# Patient Record
(demographics unavailable — no encounter records)

---

## 2025-02-20 NOTE — ASSESSMENT
[FreeTextEntry1] : recurrent stones for litholink reveiwed stone diet consider ESWL lower pole stone given left hydro will obtain repeat CT in 1 month and reeval UA UCX today

## 2025-02-20 NOTE — HISTORY OF PRESENT ILLNESS
[FreeTextEntry1] : brings in stone 3 passed stones over past few years ct from recent ER visit reviewed additional lower pole left stone identified with mild hydro repeat renal sono today with persistent mild hydro no pain feels well creat 0.86 in ER

## 2025-03-13 NOTE — END OF VISIT
[FreeTextEntry3] : I, Dr. Paulino, personally performed the evaluation and management (E/M) services for this established patient who presents today with (a) new problem(s)/exacerbation of (an) existing condition(s). That E/M includes conducting the clinically appropriate interval history &/or exam, assessing all new/exacerbated conditions, and establishing a new plan of care. Today, my ESTHELA, Tato Whitmore, was here to observe my evaluation and management service for this new problem/exacerbated condition and follow the plan of care established by me going forward

## 2025-03-13 NOTE — PHYSICAL EXAM
[Normal Appearance] : normal appearance [General Appearance - In No Acute Distress] : no acute distress [Edema] : no peripheral edema [Abdomen Soft] : soft [Abdomen Tenderness] : non-tender [Costovertebral Angle Tenderness] : no ~M costovertebral angle tenderness [Normal Station and Gait] : the gait and station were normal for the patient's age [Oriented To Time, Place, And Person] : oriented to person, place, and time [Affect] : the affect was normal

## 2025-03-13 NOTE — HISTORY OF PRESENT ILLNESS
[FreeTextEntry1] : 49F here for follow up to review CT scan   - ureteral stone passed, resolution of hydronephrosis  - per analysis 2/2025 "not a stone" - 8 mm L lower pole stone - will perform 24 hour urine, has kit at home  - reports in the past was told needed to increaser fluid intake and has made major changes to diet  Denies: hematuria dysuria n/v/d flank pain fever chills

## 2025-03-13 NOTE — ASSESSMENT
[FreeTextEntry1] : 49F here for follow up to review CT scan for L hydro and nephrolithiasis  8mm lower pole left stone - no hydro on ct  - 24 hour urine.  passed multiple stones reviewed role ESWL will scheudle risks bleeding, flank pain, persistent stone, steinstrasse reviewed opts to proceed

## 2025-04-29 NOTE — ASSESSMENT
[FreeTextEntry1] : recurrent stones I spoke with the patient about diet modification at length, which includes:  Increasing fluid intake to produce 2 to 2.5 liters of urine per day (approx 3 liters intake), and should be primarily water.    Calcium intake should be approximately 1000 mg per day.    Oxalate intake should be reduced- most common sources in diet which have very high levels include peanuts/nuts, tea, coffee, chocolate, spinach, beets, rhubarb, swiss chard.  I've also given/directed to a list with other high oxalate.    Animal flesh protein should be controlled- approx 4 to 6 ounces per day, with some vegetarian days included in the week.  Studies have shown that vegetarians have half the risk of stones of people who eat only 4 ounces per day of meat or fish of any kind (beef, chicken, fish, shellfish, pork, etc), indicating that a vegetarian lifestyle can decrease future stone risks.  Finally, salt intake should be reduced as high levels in the diet will increase urinary calcium.  <2400 mg per day on a low sodium diet is strongly recommended.  Citrate is a benefit; jas and limes with most citrate and least sugar- recommend 'a lemon or lime a day'; easiest with concentrate, mixed into water or other beverages.  Lifestyle changes: regular exercise and weight loss both are independent risk-reducers for stones.  In addition, for further details online, go to www.[employer] <[email or web address]> <<http://www.[employer]>> ---> in the lower left column there is a link for "diet resources", and review or download. refer nephrology stone chem repeat sono 6montns

## 2025-04-29 NOTE — HISTORY OF PRESENT ILLNESS
[FreeTextEntry1] : feels well passed small stone fragments s/p ESWL renal sono today - small stone fragments 3mm lower pole ?these may represent parenchymal calc seen on CT prior to ESWL 24 hr urine - high pH, low vol, +SSCaOX